# Patient Record
Sex: FEMALE
[De-identification: names, ages, dates, MRNs, and addresses within clinical notes are randomized per-mention and may not be internally consistent; named-entity substitution may affect disease eponyms.]

---

## 2017-09-14 ENCOUNTER — APPOINTMENT (OUTPATIENT)
Dept: NEUROLOGY | Facility: CLINIC | Age: 72
End: 2017-09-14

## 2018-10-10 ENCOUNTER — APPOINTMENT (OUTPATIENT)
Dept: ORTHOPEDIC SURGERY | Facility: CLINIC | Age: 73
End: 2018-10-10
Payer: MEDICARE

## 2018-10-10 VITALS
WEIGHT: 247 LBS | HEART RATE: 93 BPM | DIASTOLIC BLOOD PRESSURE: 76 MMHG | SYSTOLIC BLOOD PRESSURE: 128 MMHG | BODY MASS INDEX: 42.17 KG/M2 | HEIGHT: 64 IN

## 2018-10-10 DIAGNOSIS — M51.34 OTHER INTERVERTEBRAL DISC DEGENERATION, THORACIC REGION: ICD-10-CM

## 2018-10-10 DIAGNOSIS — M54.6 LOW BACK PAIN: ICD-10-CM

## 2018-10-10 DIAGNOSIS — M54.2 CERVICALGIA: ICD-10-CM

## 2018-10-10 DIAGNOSIS — M54.5 LOW BACK PAIN: ICD-10-CM

## 2018-10-10 DIAGNOSIS — M43.16 SPONDYLOLISTHESIS, LUMBAR REGION: ICD-10-CM

## 2018-10-10 PROCEDURE — 99214 OFFICE O/P EST MOD 30 MIN: CPT

## 2018-10-10 PROCEDURE — 72100 X-RAY EXAM L-S SPINE 2/3 VWS: CPT

## 2018-10-10 PROCEDURE — 72070 X-RAY EXAM THORAC SPINE 2VWS: CPT

## 2018-10-10 RX ORDER — DICLOFENAC SODIUM 75 MG/1
75 TABLET, DELAYED RELEASE ORAL
Qty: 1 | Refills: 1 | Status: ACTIVE | COMMUNITY
Start: 2018-10-10 | End: 1900-01-01

## 2019-10-11 ENCOUNTER — APPOINTMENT (OUTPATIENT)
Dept: ORTHOPEDIC SURGERY | Facility: CLINIC | Age: 74
End: 2019-10-11
Payer: MEDICARE

## 2019-10-11 VITALS
DIASTOLIC BLOOD PRESSURE: 74 MMHG | HEIGHT: 64 IN | HEART RATE: 92 BPM | WEIGHT: 247 LBS | SYSTOLIC BLOOD PRESSURE: 122 MMHG | BODY MASS INDEX: 42.17 KG/M2

## 2019-10-11 DIAGNOSIS — M47.816 SPONDYLOSIS W/OUT MYELOPATHY OR RADICULOPATHY, LUMBAR REGION: ICD-10-CM

## 2019-10-11 DIAGNOSIS — Z96.651 PRESENCE OF RIGHT ARTIFICIAL KNEE JOINT: ICD-10-CM

## 2019-10-11 PROCEDURE — 72170 X-RAY EXAM OF PELVIS: CPT

## 2019-10-11 PROCEDURE — 99214 OFFICE O/P EST MOD 30 MIN: CPT

## 2019-10-11 PROCEDURE — 73562 X-RAY EXAM OF KNEE 3: CPT | Mod: LT

## 2019-10-11 PROCEDURE — 72100 X-RAY EXAM L-S SPINE 2/3 VWS: CPT

## 2019-10-22 NOTE — DISCUSSION/SUMMARY
[de-identified] : Stable bilateral TKRs, Lumbar DJD \par The patient was informed of the findings and recommended conservative management in the form of a home exercise program, activity modifications, stationary bicycling, swimming and weight loss program. A trial of Glucosamine and Chondroiten Sulphate was recommended.\par A prescription for a course of physical therapy was provided.\par Patient denies need for prescription NSAIDs at this time. She will \par I have provided the patient with a referral to Dr. Nicholson for evaluation of the lumbar spine. \par Follow-up appointment was recommended annually.

## 2019-10-22 NOTE — CONSULT LETTER
[Dear  ___] : Dear  [unfilled], [Consult Letter:] : I had the pleasure of evaluating your patient, [unfilled]. [Consult Closing:] : Thank you very much for allowing me to participate in the care of this patient.  If you have any questions, please do not hesitate to contact me. [Sincerely,] : Sincerely, [FreeTextEntry2] : MONA BURNETT  [FreeTextEntry1] : Stable bilateral TKRs, Lumbar DJD \par The patient was informed of the findings and recommended conservative management in the form of a home exercise program, activity modifications, stationary bicycling, swimming and weight loss program. A trial of Glucosamine and Chondroiten Sulphate was recommended.\par A prescription for a course of physical therapy was provided.\par Patient denies need for prescription NSAIDs at this time. She will \par I have provided the patient with a referral to Dr. Nicholson for evaluation of the lumbar spine. \par Follow-up appointment was recommended annually. [FreeTextEntry3] : Mckinley Vasquez MD\par \par ______________________________________________\par [facility]: Hip/Knee Arthroplasty\par [address], [facility] [address], [place] [address]\par (t) [phone]\par (f) [phone]

## 2019-10-22 NOTE — PHYSICAL EXAM
[Antalgic] : antalgic [LE] : Sensory: Intact in bilateral lower extremities [Ankle] : ankle 2+ and symmetric bilaterally [Knee] : patellar 2+ and symmetric bilaterally [DP] : dorsalis pedis 2+ and symmetric bilaterally [PT] : posterior tibial 2+ and symmetric bilaterally [de-identified] : On general examination the patient is adequately groomed and nourished. The vital parameters are as recorded. \par There is no lymphedema or diffuse swelling, no varicose veins, no skin warmth/erythema/scars/swelling, no ulcers and no palpable lymph nodes or masses in both lower extremities. Bilateral pedal pulses are well palpable.\par Upper Extremity:\par Both right and left upper extremities are unremarkable in terms of skin rash, lesions, pigmentation, redness, tenderness, swelling, joint instability, abnormal deformity or crepitus. The overall range of motion, sensation, motor tone and strength testing are normal.\par \par Right Knee: Walks with a right stiff knee gait. There is a well-healed scar surgery with no significant swelling, redness or tenderness. There is a valgus alignment of 5°, no effusion, active straight leg raise of 60° and knee range of motion of 0-110° with good stability alignment and quadriceps grade 4 power. There is mild calf pain. No loss of plantar/dorsi flexion. \par \par Left Knee: Walks with a left stiff knee gait. There is a well-healed scar surgery with no significant swelling, redness or tenderness. There is a valgus alignment of 5°, no effusion, active straight leg raise of 60° and knee range of motion of 0-115° with good stability alignment and quadriceps grade 4 power. \par \par Hip Exam:\par The gait and station is normal\par The patient has equal leg lengths and no pelvic tilt. Ryley/Raissa test is 7 inches on the right and 7 inches on the left. Active SLR is 60 degrees on the right and 60 degrees on the left. Both hips demonstrate no scars and the skin has no signs of inflammation or tenderness. \par Both Hips have a normal range of motion of flexion to 100 degrees, abduction 40 degrees, adduction 20 degrees, external rotation 40 degrees, internal rotation 20 degrees with symmetrical motion in flexion and extension. There is no flexion contracture, deformity or instability. Labral impingement tests are negative.\par Both hips flexor, abductor and extensor power is normal.\par \par Spine Exam:\par There is mild curvature of the spine with loss of normal lumbar lordosis. The skin is devoid of erythema, scars, pigmentation or rashes. There is mild lumbar spasm and tenderness especially at L4-L5 or L5-S1. \par The range of motion of the lumbar spine is limited by pain and spasm. Forward flexion is 80% normal, extension catch positive, lateral flexion and rotation 80% normal. There is no lumbar spine imbalance or instability detected.\par Bilateral passive SLR is right 40 degrees, left 40 degrees in supine and sitting positions. Lasegue's Test is negative.\par Neurology:\par The patient is alert and oriented in person, place and time. The mood is calm and affect is normal.\par Testing for coordination including Rhomberg's Test and Finger-Nose Test, sensation, motor tone and power and deep tendon reflexes in both lower extremities is normal. [de-identified] : AP, Lateral, and Sunrise Radiographs of the  knee taken today reveal well fixed and aligned bilateral total knee replacement with no evidence of mechanical failure or periprosthetic fracture.\par AP and Lateral Radiographs of the lumbar spine reveal Lumbar DJD with loss of normal lordosis.

## 2019-10-22 NOTE — HISTORY OF PRESENT ILLNESS
[3] : a current pain level of 3/10 [0] : an average pain level of 0/10 [de-identified] : Ms. GOLDIE MASON is a 74 year old female, status post right TKR 2004, status post left TKR 2011, presenting with a two day history of bilateral knee pain and right calf pain. SHe states that yesterday she banged the the left knee into a coffee table and then twisted the right knee and calf. SHe has since had generalized left knee pain and posterior right knee pain/calf pain. She also notes some swelling. Symptoms are worsened with weightbearing activity. She has not had any recent PT. SHe takes OTC NSAIDs as needed with some improvement. Patient wishes to have xrays to make sure the TKRs are okay. In addition to this, patient admits to some lower back pain that sometimes radiates down the legs. NAOMI.

## 2019-11-13 ENCOUNTER — APPOINTMENT (OUTPATIENT)
Dept: ORTHOPEDIC SURGERY | Facility: CLINIC | Age: 74
End: 2019-11-13

## 2020-09-29 ENCOUNTER — APPOINTMENT (OUTPATIENT)
Dept: ORTHOPEDIC SURGERY | Facility: CLINIC | Age: 75
End: 2020-09-29
Payer: MEDICARE

## 2020-09-29 VITALS
HEIGHT: 64 IN | DIASTOLIC BLOOD PRESSURE: 78 MMHG | HEART RATE: 87 BPM | BODY MASS INDEX: 41.92 KG/M2 | WEIGHT: 245.56 LBS | OXYGEN SATURATION: 96 % | SYSTOLIC BLOOD PRESSURE: 126 MMHG

## 2020-09-29 DIAGNOSIS — Z96.652 PRESENCE OF LEFT ARTIFICIAL KNEE JOINT: ICD-10-CM

## 2020-09-29 DIAGNOSIS — I83.892 VARICOSE VEINS OF LEFT LOWER EXTREMITY WITH OTHER COMPLICATIONS: ICD-10-CM

## 2020-09-29 DIAGNOSIS — M54.16 RADICULOPATHY, LUMBAR REGION: ICD-10-CM

## 2020-09-29 DIAGNOSIS — M17.12 UNILATERAL PRIMARY OSTEOARTHRITIS, LEFT KNEE: ICD-10-CM

## 2020-09-29 PROCEDURE — 73562 X-RAY EXAM OF KNEE 3: CPT | Mod: LT

## 2020-09-29 PROCEDURE — 72170 X-RAY EXAM OF PELVIS: CPT

## 2020-09-29 PROCEDURE — 99214 OFFICE O/P EST MOD 30 MIN: CPT

## 2020-09-29 PROCEDURE — 72100 X-RAY EXAM L-S SPINE 2/3 VWS: CPT

## 2020-09-29 NOTE — DISCUSSION/SUMMARY
[de-identified] : Stable bilateral TKRs, Lumbar DJD with radicular symptoms, along with varicose veins with swelling, likely superficial phlebitis vs rule out DVT left LE. \par  \par The patient was informed of the findings and reassured the implants remain stable, with no sign of mechanical failure on examination as well as radiographs. she has been recommended for routine home exercise to maintain strength and mobility. \par She is showing signs of inflammation of the varicose veins, and I would like to rule out DVT. \par She has been provided a prescription for a venous doppler to rule out DVT. I have provided the patient a referral to Dr. Larry for evaluation of PVD and varicose veins. Concerning the LE swelling, compression has been recommended, as well as elevation of the lower extremities. She  has been recommended for exercise, including foot pumps and walking. \par Concerning the lower back, she does have some pain stemming from stenosis of the lumbar spine I have provided the patient with a referral to Dr. Chase for evaluation of the lumbar spine and further treatment of her symptoms. \par She will follow up annually for her knees. \par We will contact her with results of the doppler study. \par

## 2020-09-29 NOTE — CONSULT LETTER
[Dear  ___] : Dear  [unfilled], [Consult Letter:] : I had the pleasure of evaluating your patient, [unfilled]. [Consult Closing:] : Thank you very much for allowing me to participate in the care of this patient.  If you have any questions, please do not hesitate to contact me. [Sincerely,] : Sincerely, [FreeTextEntry2] : MONA BURNETT [FreeTextEntry3] : Mckinley Vasquez MD\par \par ______________________________________________\par [facility]: Hip/Knee Arthroplasty\par [address], [facility] [address], [place] [address]\par (t) [phone]\par (f) [phone]

## 2020-09-29 NOTE — PHYSICAL EXAM
[Antalgic] : antalgic [LE] : Sensory: Intact in bilateral lower extremities [Knee] : patellar 2+ and symmetric bilaterally [Ankle] : ankle 2+ and symmetric bilaterally [DP] : dorsalis pedis 2+ and symmetric bilaterally [PT] : posterior tibial 2+ and symmetric bilaterally [de-identified] : On general examination the patient is adequately groomed and nourished. The vital parameters are as recorded. \par There is swelling bilateral LE, with varicose veins, no skin warmth/erythema/scars/swelling, no ulcers and no palpable lymph nodes or masses in both lower extremities. Bilateral pedal pulses are well palpable.\par Upper Extremity:\par Both right and left upper extremities are unremarkable in terms of skin rash, lesions, pigmentation, redness, tenderness, swelling, joint instability, abnormal deformity or crepitus. The overall range of motion, sensation, motor tone and strength testing are normal.\par \par \par Left Knee: Walks with a normal gait.  There is a well-healed scar surgery with no significant swelling, redness or tenderness. There is a valgus alignment of 5°, no effusion, active straight leg raise of 60° and knee range of motion of 0-115° with good stability alignment and quadriceps grade 4 power. There is hypersensitivity along the mid calf and medial aspect of the knee, with tenderness along the tissues. There are varicose veins bilateral LE, left sided more tender than right. \par \par Hip Exam:\par The gait and station is normal\par The patient has equal leg lengths and no pelvic tilt. Ryley/Raissa test is 7 inches on the right and 7 inches on the left. Active SLR is 60 degrees on the right and 60 degrees on the left. Both hips demonstrate no scars and the skin has no signs of inflammation or tenderness. \par Both Hips have a normal range of motion of flexion to 100 degrees, abduction 40 degrees, adduction 20 degrees, external rotation 40 degrees, internal rotation 20 degrees with symmetrical motion in flexion and extension. There is no flexion contracture, deformity or instability. Labral impingement tests are negative.\par Both hips flexor, abductor and extensor power is normal.\par \par Spine Exam:\par There is mild curvature of the spine with loss of normal lumbar lordosis. The skin is devoid of erythema, scars, pigmentation or rashes. There is mild lumbar spasm and tenderness especially at L4-L5 or L5-S1. \par The range of motion of the lumbar spine is limited by pain and spasm. Forward flexion is 80% normal, extension catch positive, lateral flexion and rotation 80% normal. There is no lumbar spine imbalance or instability detected.\par Bilateral passive SLR is right 40 degrees, left 40 degrees in supine and sitting positions. Lasegue's Test is negative.\par Neurology:\par The patient is alert and oriented in person, place and time. The mood is calm and affect is normal.\par Testing for coordination including Rhomberg's Test and Finger-Nose Test, sensation, motor tone and power and deep tendon reflexes in both lower extremities is normal. [de-identified] : The following radiographs were ordered and read by me during this patients visit. I reviewed each radiograph in detail with the patient and discussed the findings as highlighted below. \par AP, Lateral, and Sunrise Radiographs of the  knee taken today reveal well fixed and aligned bilateral total knee replacement with no evidence of mechanical failure or periprosthetic fracture.\par AP and Lateral Radiographs of the lumbar spine reveal Lumbar DJD with loss of normal lordosis, spondylolisthesis L4-5\par AP view of the pelvis is within normal limits.

## 2020-09-29 NOTE — HISTORY OF PRESENT ILLNESS
[3] : a current pain level of 3/10 [0] : an average pain level of 0/10 [de-identified] : Ms. GOLDIE MASON is a 74 year old female, status post right TKR 2004, status post left TKR 2011, presenting with a one week history of left mid calf and knee pain. She denies any specific injury. She notes he was sitting down, and placed her hand onto the inside of her left knee, and felt immediate pain and sensitivity with light touch. She notes she is now unable to touch the area without severe shock and pain. She notes she was concerned for shingles, but denies rash. She admits to some radiation of the pain to the posterior aspect of the left leg, up to the thigh and buttock left sided.  She also notes some swelling. She denies pain with weight bearing. \par She has not had any recent PT. SHe takes OTC NSAIDs as needed with some improvement. She admits to a history of lower back pain long standing, and has been seen by many of my colleagues for the symptoms as well. \par She presents for reevaluation.

## 2020-09-29 NOTE — REASON FOR VISIT
[FreeTextEntry2] : follow up status post bilateral TKRs  [Follow-Up Visit] : a follow-up visit for [Knee Pain] : knee pain